# Patient Record
Sex: FEMALE | Race: WHITE | NOT HISPANIC OR LATINO | Employment: FULL TIME | URBAN - METROPOLITAN AREA
[De-identification: names, ages, dates, MRNs, and addresses within clinical notes are randomized per-mention and may not be internally consistent; named-entity substitution may affect disease eponyms.]

---

## 2023-07-15 ENCOUNTER — HOSPITAL ENCOUNTER (EMERGENCY)
Facility: HOSPITAL | Age: 38
Discharge: HOME | End: 2023-07-15
Attending: EMERGENCY MEDICINE | Admitting: EMERGENCY MEDICINE
Payer: COMMERCIAL

## 2023-07-15 VITALS
OXYGEN SATURATION: 99 % | WEIGHT: 120 LBS | BODY MASS INDEX: 23.56 KG/M2 | HEIGHT: 60 IN | SYSTOLIC BLOOD PRESSURE: 101 MMHG | HEART RATE: 76 BPM | TEMPERATURE: 97.7 F | DIASTOLIC BLOOD PRESSURE: 50 MMHG | RESPIRATION RATE: 20 BRPM

## 2023-07-15 DIAGNOSIS — R51.9 NONINTRACTABLE EPISODIC HEADACHE, UNSPECIFIED HEADACHE TYPE: ICD-10-CM

## 2023-07-15 PROCEDURE — 96375 TX/PRO/DX INJ NEW DRUG ADDON: CPT

## 2023-07-15 PROCEDURE — 99284 EMERGENCY DEPT VISIT MOD MDM: CPT | Mod: 25

## 2023-07-15 PROCEDURE — 258N000003 HC RX IP 258 OP 636: Performed by: EMERGENCY MEDICINE

## 2023-07-15 PROCEDURE — 96374 THER/PROPH/DIAG INJ IV PUSH: CPT

## 2023-07-15 PROCEDURE — 250N000011 HC RX IP 250 OP 636: Performed by: EMERGENCY MEDICINE

## 2023-07-15 PROCEDURE — 96361 HYDRATE IV INFUSION ADD-ON: CPT

## 2023-07-15 RX ORDER — METOCLOPRAMIDE HYDROCHLORIDE 5 MG/ML
10 INJECTION INTRAMUSCULAR; INTRAVENOUS ONCE
Status: COMPLETED | OUTPATIENT
Start: 2023-07-15 | End: 2023-07-15

## 2023-07-15 RX ORDER — DIPHENHYDRAMINE HYDROCHLORIDE 50 MG/ML
25 INJECTION INTRAMUSCULAR; INTRAVENOUS ONCE
Status: COMPLETED | OUTPATIENT
Start: 2023-07-15 | End: 2023-07-15

## 2023-07-15 RX ORDER — KETOROLAC TROMETHAMINE 15 MG/ML
15 INJECTION, SOLUTION INTRAMUSCULAR; INTRAVENOUS ONCE
Status: COMPLETED | OUTPATIENT
Start: 2023-07-15 | End: 2023-07-15

## 2023-07-15 RX ORDER — DEXAMETHASONE SODIUM PHOSPHATE 10 MG/ML
10 INJECTION, SOLUTION INTRAMUSCULAR; INTRAVENOUS ONCE
Status: COMPLETED | OUTPATIENT
Start: 2023-07-15 | End: 2023-07-15

## 2023-07-15 RX ADMIN — DIPHENHYDRAMINE HYDROCHLORIDE 25 MG: 50 INJECTION, SOLUTION INTRAMUSCULAR; INTRAVENOUS at 12:47

## 2023-07-15 RX ADMIN — KETOROLAC TROMETHAMINE 15 MG: 15 INJECTION INTRAMUSCULAR; INTRAVENOUS at 12:45

## 2023-07-15 RX ADMIN — METOCLOPRAMIDE 10 MG: 5 INJECTION, SOLUTION INTRAMUSCULAR; INTRAVENOUS at 12:49

## 2023-07-15 RX ADMIN — SODIUM CHLORIDE 1000 ML: 9 INJECTION, SOLUTION INTRAVENOUS at 12:37

## 2023-07-15 RX ADMIN — DEXAMETHASONE SODIUM PHOSPHATE 10 MG: 10 INJECTION, SOLUTION INTRAMUSCULAR; INTRAVENOUS at 12:51

## 2023-07-15 ASSESSMENT — ACTIVITIES OF DAILY LIVING (ADL): ADLS_ACUITY_SCORE: 35

## 2023-07-15 NOTE — DISCHARGE INSTRUCTIONS
Please follow-up with your Primary Care Provider within 3-5 days; call to arrange appointment.      Return to the ER for worsening symptoms, sudden onset or severe headache, focal neurologic deficit (for example, facial droop or right arm weakness), persistent nausea / vomiting, fever or other concerns.

## 2023-07-15 NOTE — ED PROVIDER NOTES
Emergency Department Encounter     Evaluation Date & Time:   7/15/2023 12:20 PM    CHIEF COMPLAINT:  Headache      Triage Note:    patient reports migraine for 3 days, has a history of such has tried abortive medications at home without relief.         Impression and Plan       FINAL IMPRESSION:    ICD-10-CM    1. Nonintractable episodic headache, unspecified headache type  R51.9             ED COURSE & MEDICAL DECISION MAKIN:21 PM Met with patient for initial interview and exam.  1:49 PM Rechecked patient - she is feeling much better and ready for discharge to home      38 year old female, history of hormonal migraines, who presents for evaluation of gradual onset of throbbing, sharp left orbital and posterior occiput headache x 3 days not resolving with sumatriptan x 2 doses. The headache is worse with activity and she endorses photophobia and phonophobia. No associated vision changes, extremity weakness / paresthesias. She has had associated nausea with one episode of vomiting. No fevers. This headache has lasted longer and is more intense than her typical migraines.     Neuro exam is normal.  I do not suspect mass lesion, ICH, SAH, meningitis or encephalitis and do not think emergent imaging studies and / or LP are indicated.    Patient given IV fluids, IV Reglan, IV Benadryl, IV Toradol and IV Decadron with significant improvement.    Patient discharged to home with follow-up with her Primary Care Provider.  Return precautions provided.  Patient stable throughout ED course.      At the conclusion of the encounter I discussed the results of all the tests and the disposition. The questions were answered. The patient acknowledged understanding and was agreeable with the care plan.      Medical Decision Making    History:    Supplemental history from: Documented in chart, if applicable    External Record(s) reviewed: Documented in chart, if applicable.    Work Up:    Chart documentation includes  differential considered and any EKGs or imaging independently interpreted by provider, where specified.    In additional to work up documented, I considered the following work up: Documented in chart, if applicable. SEE ABOVE    External consultation:    Discussion of management with another provider: Documented in chart, if applicable    Complicating factors:    Care impacted by chronic illness: Other: migraine headaches    Care affected by social determinants of health: N/A    Disposition considerations: Discharge. No recommendations on prescription strength medication(s). I considered admission, but ultimately discharged patient given reassuring exam and clinical improvement.      MEDICATIONS GIVEN IN THE EMERGENCY DEPARTMENT:  Medications   0.9% sodium chloride BOLUS (0 mLs Intravenous Stopped 7/15/23 1348)   metoclopramide (REGLAN) injection 10 mg (10 mg Intravenous $Given 7/15/23 1249)   diphenhydrAMINE (BENADRYL) injection 25 mg (25 mg Intravenous $Given 7/15/23 1247)   ketorolac (TORADOL) injection 15 mg (15 mg Intravenous $Given 7/15/23 1245)   dexamethasone PF (DECADRON) injection 10 mg (10 mg Intravenous $Given 7/15/23 1251)       NEW PRESCRIPTIONS STARTED AT TODAY'S ED VISIT:  There are no discharge medications for this patient.      HPI     The history is provided by the patient. No  was used.     Shawnee García is a 38 year old female, history of hormonal migraines and polycystic ovary syndrome, who presents to this ED by walk in for evaluation of headache.     The patient reports gradual onset headache on Thursday morning (3 days ago). She took prescribed sumatriptan Thursday night, but awoke with ongoing headache on Friday. She took an additional dose of sumatriptan Friday, but her headache has only worsened. The headache is located behind the left eye and to the posterior occiput. The pain is throbbing and sharp in nature and worse with activity. She also endorses photophobia  and phonophobia. She has had associated nausea with one episode of vomiting on Thursday night. This headache is lasted longer than her typical migraine headaches and is more intense. She denies associated vision changes, focal extremity weakness / paresthesias.     The patient got her prescription for migraine medication from her primary care provider and has no history of follow-up with a neurologist. Her mother had a history of hormonal migraines as well.     She reports some right-sided sinus congestion; no URI symptoms or fevers. She also reports some back pain. She has otherwise been in her usual state of health and denies chest pain, shortness of breath, abdominal pain, diarrhea or other concerns.    She is currently menstruating.       REVIEW OF SYSTEMS:  All other systems reviewed and are negative.      Medical History     No past medical history on file.    No past surgical history on file.    No family history on file.         No current outpatient medications on file.      Physical Exam     First Vitals:    /50   Pulse 76   Temp 97.7  F (36.5  C) (Temporal)   Resp 20   Ht 1.524 m (5')   Wt 54.4 kg (120 lb)   LMP 07/15/2023   SpO2 99%   BMI 23.44 kg/m        PHYSICAL EXAM:   Physical Exam    GENERAL: Awake, alert.  In mild acute distress.   HEENT: Normocephalic, atraumatic. Pupils equal, round and reactive. Conjunctiva normal. EOMI without nystagmus.  Normal posterior oropharynx.  Tongue is midline.  NECK: Neck is supple without meningismus.  No stridor.  PULMONARY: Symmetrical breath sounds without distress.  Lungs clear to auscultation bilaterally without wheezes, rhonchi or rales.  CARDIO: Regular rate and rhythm.  No significant murmur, rub or gallop.    ABDOMINAL: Abdomen soft, non-distended and non-tender to palpation.   EXTREMITIES: No lower extremity swelling or edema.      NEURO: Alert and oriented to person, place and time.  Cranial nerves III-XII intact.  Strength 5/5 BL upper and  lower extremities with sensation to light touch grossly intact.   PSYCH: Normal mood and affect.      I, Windy Giovanni, am serving as a scribe to document services personally performed by Tessa Rosenthal MD based on my observation and the provider's statements to me. I, Tessa Rosenthal MD attest that Valentinfang Giovanni is acting in a scribe capacity, has observed my performance of the services and has documented them in accordance with my direction.    Tessa Rosenthal MD  Emergency Medicine  [facility] EMERGENCY DEPARTMENT           Tessa Rosenthal MD  07/16/23 5225